# Patient Record
Sex: MALE | Race: WHITE | URBAN - METROPOLITAN AREA
[De-identification: names, ages, dates, MRNs, and addresses within clinical notes are randomized per-mention and may not be internally consistent; named-entity substitution may affect disease eponyms.]

---

## 2020-08-07 ENCOUNTER — NEW PATIENT (OUTPATIENT)
Dept: URBAN - METROPOLITAN AREA CLINIC 30 | Facility: CLINIC | Age: 70
End: 2020-08-07
Payer: COMMERCIAL

## 2020-08-07 DIAGNOSIS — H25.813 COMBINED FORMS OF AGE-RELATED CATARACT, BILATERAL: ICD-10-CM

## 2020-08-07 DIAGNOSIS — H35.61 RETINAL HEMORRHAGE, RIGHT EYE: Primary | ICD-10-CM

## 2020-08-07 DIAGNOSIS — H40.023 OPEN ANGLE WITH BORDERLINE FINDINGS, HIGH RISK, BILATERAL: ICD-10-CM

## 2020-08-07 PROCEDURE — 92134 CPTRZ OPH DX IMG PST SGM RTA: CPT | Performed by: OPTOMETRIST

## 2020-08-07 PROCEDURE — 92004 COMPRE OPH EXAM NEW PT 1/>: CPT | Performed by: OPTOMETRIST

## 2020-08-07 ASSESSMENT — KERATOMETRY
OS: 45.37
OD: 45.95

## 2020-08-07 ASSESSMENT — VISUAL ACUITY
OD: 20/50
OS: 20/250

## 2020-08-07 ASSESSMENT — INTRAOCULAR PRESSURE
OS: 14
OD: 14

## 2020-10-13 ENCOUNTER — Encounter (OUTPATIENT)
Dept: URBAN - METROPOLITAN AREA CLINIC 30 | Facility: CLINIC | Age: 70
End: 2020-10-13
Payer: COMMERCIAL

## 2020-10-13 DIAGNOSIS — Z01.818 ENCOUNTER FOR OTHER PREPROCEDURAL EXAMINATION: Primary | ICD-10-CM

## 2020-10-13 DIAGNOSIS — H25.12 AGE-RELATED NUCLEAR CATARACT, LEFT EYE: Primary | ICD-10-CM

## 2020-10-13 PROCEDURE — 99202 OFFICE O/P NEW SF 15 MIN: CPT | Performed by: PHYSICIAN ASSISTANT

## 2020-10-13 PROCEDURE — 99213 OFFICE O/P EST LOW 20 MIN: CPT | Performed by: PHYSICIAN ASSISTANT

## 2020-10-13 RX ORDER — FUROSEMIDE 20 MG/1
20 MG TABLET ORAL
Qty: 0 | Refills: 0 | Status: INACTIVE
Start: 2020-10-13 | End: 2020-11-03

## 2020-10-13 RX ORDER — METOPROLOL TARTRATE 50 MG/1
50 MG TABLET, FILM COATED ORAL
Qty: 0 | Refills: 0 | Status: INACTIVE
Start: 2020-10-13 | End: 2020-11-03

## 2020-10-19 ENCOUNTER — NEW PATIENT (OUTPATIENT)
Dept: URBAN - METROPOLITAN AREA CLINIC 24 | Facility: CLINIC | Age: 70
End: 2020-10-19
Payer: COMMERCIAL

## 2020-10-19 PROCEDURE — 92002 INTRM OPH EXAM NEW PATIENT: CPT | Performed by: OPHTHALMOLOGY

## 2020-10-19 ASSESSMENT — INTRAOCULAR PRESSURE
OD: 15
OS: 15

## 2020-10-26 ENCOUNTER — SURGERY (OUTPATIENT)
Dept: URBAN - METROPOLITAN AREA SURGERY 12 | Facility: SURGERY | Age: 70
End: 2020-10-26
Payer: COMMERCIAL

## 2020-10-26 PROCEDURE — 66984 XCAPSL CTRC RMVL W/O ECP: CPT | Performed by: OPHTHALMOLOGY

## 2020-10-27 ENCOUNTER — POST OP (OUTPATIENT)
Dept: URBAN - METROPOLITAN AREA CLINIC 30 | Facility: CLINIC | Age: 70
End: 2020-10-27
Payer: COMMERCIAL

## 2020-10-27 PROCEDURE — 99024 POSTOP FOLLOW-UP VISIT: CPT | Performed by: OPTOMETRIST

## 2020-10-27 RX ORDER — POLYETHYLENE GLYCOL 400 AND PROPYLENE GLYCOL 4; 3 MG/ML; MG/ML
SOLUTION/ DROPS OPHTHALMIC
Qty: 0 | Refills: 0 | Status: INACTIVE
Start: 2020-10-27 | End: 2021-11-19

## 2020-10-27 ASSESSMENT — INTRAOCULAR PRESSURE: OS: 10

## 2020-11-03 ENCOUNTER — POST OP (OUTPATIENT)
Dept: URBAN - METROPOLITAN AREA CLINIC 30 | Facility: CLINIC | Age: 70
End: 2020-11-03
Payer: COMMERCIAL

## 2020-11-03 DIAGNOSIS — Z96.1 PRESENCE OF INTRAOCULAR LENS: Primary | ICD-10-CM

## 2020-11-03 PROCEDURE — 99024 POSTOP FOLLOW-UP VISIT: CPT | Performed by: OPTOMETRIST

## 2020-11-03 ASSESSMENT — INTRAOCULAR PRESSURE
OD: 12
OS: 9

## 2020-11-03 ASSESSMENT — VISUAL ACUITY
OS: 20/25
OD: 20/50

## 2020-11-09 ENCOUNTER — SURGERY (OUTPATIENT)
Dept: URBAN - METROPOLITAN AREA SURGERY 12 | Facility: SURGERY | Age: 70
End: 2020-11-09
Payer: COMMERCIAL

## 2020-11-09 PROCEDURE — 66984 XCAPSL CTRC RMVL W/O ECP: CPT | Performed by: OPHTHALMOLOGY

## 2020-11-10 ENCOUNTER — POST OP (OUTPATIENT)
Dept: URBAN - METROPOLITAN AREA CLINIC 30 | Facility: CLINIC | Age: 70
End: 2020-11-10
Payer: COMMERCIAL

## 2020-11-10 PROCEDURE — 99024 POSTOP FOLLOW-UP VISIT: CPT | Performed by: OPTOMETRIST

## 2020-11-10 ASSESSMENT — INTRAOCULAR PRESSURE: OD: 12

## 2020-12-01 ENCOUNTER — POST OP (OUTPATIENT)
Dept: URBAN - METROPOLITAN AREA CLINIC 30 | Facility: CLINIC | Age: 70
End: 2020-12-01
Payer: COMMERCIAL

## 2020-12-01 PROCEDURE — 99024 POSTOP FOLLOW-UP VISIT: CPT | Performed by: OPTOMETRIST

## 2020-12-01 RX ORDER — MINERAL OIL, PETROLATUM 425; 573 MG/G; MG/G
OINTMENT OPHTHALMIC
Qty: 1 | Refills: 11 | Status: INACTIVE
Start: 2020-12-01 | End: 2021-11-19

## 2020-12-01 RX ORDER — CARBOXYMETHYLCELLULOSE SODIUM 5 MG/ML
0.5 % SOLUTION/ DROPS OPHTHALMIC
Qty: 1 | Refills: 11 | Status: INACTIVE
Start: 2020-12-01 | End: 2021-11-19

## 2020-12-01 ASSESSMENT — INTRAOCULAR PRESSURE
OD: 11
OS: 10

## 2020-12-01 ASSESSMENT — VISUAL ACUITY
OD: 20/25
OS: 20/25

## 2021-03-01 ENCOUNTER — FOLLOW UP ESTABLISHED (OUTPATIENT)
Dept: URBAN - METROPOLITAN AREA CLINIC 30 | Facility: CLINIC | Age: 71
End: 2021-03-01
Payer: COMMERCIAL

## 2021-03-01 PROCEDURE — 99213 OFFICE O/P EST LOW 20 MIN: CPT | Performed by: OPTOMETRIST

## 2021-03-01 PROCEDURE — 92133 CPTRZD OPH DX IMG PST SGM ON: CPT | Performed by: OPTOMETRIST

## 2021-03-01 PROCEDURE — 92083 EXTENDED VISUAL FIELD XM: CPT | Performed by: OPTOMETRIST

## 2021-03-01 ASSESSMENT — INTRAOCULAR PRESSURE
OS: 10
OD: 10

## 2021-11-19 ENCOUNTER — OFFICE VISIT (OUTPATIENT)
Dept: URBAN - METROPOLITAN AREA CLINIC 30 | Facility: CLINIC | Age: 71
End: 2021-11-19
Payer: COMMERCIAL

## 2021-11-19 DIAGNOSIS — H43.811 VITREOUS DEGENERATION, RIGHT EYE: ICD-10-CM

## 2021-11-19 DIAGNOSIS — H26.493 OTHER SECONDARY CATARACT, BILATERAL: ICD-10-CM

## 2021-11-19 PROCEDURE — 92014 COMPRE OPH EXAM EST PT 1/>: CPT | Performed by: OPTOMETRIST

## 2021-11-19 PROCEDURE — 92134 CPTRZ OPH DX IMG PST SGM RTA: CPT | Performed by: OPTOMETRIST

## 2021-11-19 ASSESSMENT — KERATOMETRY
OS: 45.34
OD: 45.70

## 2021-11-19 ASSESSMENT — VISUAL ACUITY
OD: 20/25
OS: 20/30

## 2021-11-19 ASSESSMENT — INTRAOCULAR PRESSURE
OD: 11
OS: 11

## 2021-11-19 NOTE — IMPRESSION/PLAN
Impression: Retinal hemorrhage, right eye Plan: Temporal hemorrhages on exam. AV crossing changes OU. Patient denies DM. Is on warfarin. Longstanding history of heart disease. Triple bypass in the past per patient. Reduced ejection fraction. Pt see cardiologist q6 months-will see again next month. Denies any black out in vision. MAC OCT WNL OU.

## 2021-11-19 NOTE — IMPRESSION/PLAN
Impression: Open angle with borderline findings, high risk, bilateral
+FHx-mother Tmax 15 OU Plan: Due to cupping OS. IOPs again are low. RNFL 3/1 abnormal NFL OD, normal NFL OS (poor scan quality) VF 3/1 OD high FP with sup and inf defects, OS inf arc defects Need to repeat VF test (was not scheduled for today), pt educ on findings. No tx at this time.

## 2021-11-19 NOTE — IMPRESSION/PLAN
Impression: Vitreous degeneration, right eye: H43.811. Plan: Pt educ on findings. Retinas flat and attached OU. Reviewed signs and symptoms of RD and to call asap if occurs.

## 2022-03-28 ENCOUNTER — OFFICE VISIT (OUTPATIENT)
Dept: URBAN - METROPOLITAN AREA CLINIC 30 | Facility: CLINIC | Age: 72
End: 2022-03-28
Payer: COMMERCIAL

## 2022-03-28 PROCEDURE — 92083 EXTENDED VISUAL FIELD XM: CPT | Performed by: OPTOMETRIST

## 2022-03-28 PROCEDURE — 99213 OFFICE O/P EST LOW 20 MIN: CPT | Performed by: OPTOMETRIST

## 2022-03-28 ASSESSMENT — INTRAOCULAR PRESSURE
OS: 10
OD: 11

## 2022-03-28 NOTE — IMPRESSION/PLAN
Impression: Open angle with borderline findings, high risk, bilateral
+FHx-mother Tmax 15 OU Plan: Due to cupping OS. IOPs are stable and  low: 11/10 RNFL 3/21 abnormal NFL OD, normal NFL OS (poor scan quality) VF 3/21 OD high FP with sup and inf defects, OS inf arc defects VF 3/2022:  dense sup defects, OS inf/nasal defects Pt educ on findings, pt feels he didn't understand the VF test today at first, will repeat and if defects are repeatable will consider neuroimaging order due to consistent low tensions.

## 2022-05-04 ENCOUNTER — OFFICE VISIT (OUTPATIENT)
Dept: URBAN - METROPOLITAN AREA CLINIC 30 | Facility: CLINIC | Age: 72
End: 2022-05-04
Payer: COMMERCIAL

## 2022-05-04 DIAGNOSIS — H40.1231 LOW-TENSION GLAUCOMA, BILATERAL, MILD STAGE: Primary | ICD-10-CM

## 2022-05-04 PROCEDURE — 92083 EXTENDED VISUAL FIELD XM: CPT | Performed by: OPTOMETRIST

## 2022-05-04 PROCEDURE — 99214 OFFICE O/P EST MOD 30 MIN: CPT | Performed by: OPTOMETRIST

## 2022-05-04 RX ORDER — LATANOPROST 50 UG/ML
0.005 % SOLUTION OPHTHALMIC
Qty: 5 | Refills: 5 | Status: ACTIVE
Start: 2022-05-04

## 2022-05-04 ASSESSMENT — INTRAOCULAR PRESSURE
OS: 9
OD: 10

## 2022-05-04 NOTE — IMPRESSION/PLAN
Impression: Low-tension glaucoma, bilateral, mild stage: W61.7889. +FHx- mother Tmax 15 OU
+Asthma Plan: Due to cupping OS. IOPs are stable and  low: 11/10 RNFL 3/21 abnormal NFL OD, normal NFL OS (poor scan quality) VF 3/21 OD high FP with sup and inf defects, OS inf arc defects VF 3/2022:  dense sup defects, OS inf/nasal defects; VF 5/22 repeatable sup defects OD and inf OS. Extensive cardiac history, notes he has congestive heart failure since ~2020, h/o heart valve replacement and has pacemaker since 2020. Notes BP has been lower, under 100/50 often. Consider neuroimaging order due to consistent low tensions. CT order wo contrast given to pt today. Pt will be seen by Rosy arzola tomorrow- he plans to bring imaging order. Start latanoprost qhs OU.

## 2022-08-09 ENCOUNTER — OFFICE VISIT (OUTPATIENT)
Dept: URBAN - METROPOLITAN AREA CLINIC 30 | Facility: CLINIC | Age: 72
End: 2022-08-09
Payer: COMMERCIAL

## 2022-08-09 DIAGNOSIS — H40.1231 LOW-TENSION GLAUCOMA, BILATERAL, MILD STAGE: Primary | ICD-10-CM

## 2022-08-09 PROCEDURE — 92133 CPTRZD OPH DX IMG PST SGM ON: CPT | Performed by: OPTOMETRIST

## 2022-08-09 PROCEDURE — 99213 OFFICE O/P EST LOW 20 MIN: CPT | Performed by: OPTOMETRIST

## 2022-08-09 ASSESSMENT — INTRAOCULAR PRESSURE
OD: 9
OS: 11

## 2022-08-09 NOTE — IMPRESSION/PLAN
Impression: Low-tension glaucoma, bilateral, mild stage: D26.4827. +FHx- mother Tmax 15 OU
+Asthma Plan: Due to cupping OS. Lost to follow up due to issue c [employer] auth. IOPs low and stable today, (9,11), on latanoprost QHS OU trial since last visit noting overall compliance but did not instill gtts last night. RNFL 3/21 abnormal NFL OD, normal NFL OS (poor scan quality). RNFL 8/2022: normal NFL OU, (82,75), better scan quality. VF 3/21 OD high FP with sup and inf defects, OS inf arc defects VF 3/2022:  dense sup defects, OS inf/nasal defects; VF 5/22 repeatable sup defects OD and inf OS. Extensive cardiac history, notes he has congestive heart failure since ~2020, h/o heart valve replacement and has pacemaker since 2020. Notes BP has been lower, under 100/50 often. Ordered neuroimaging due to consistent low tensions: CT order wo contrast, have not received results-ask for records release asap. Continue latanoprost QHS OU.

## 2022-11-09 ENCOUNTER — OFFICE VISIT (OUTPATIENT)
Dept: URBAN - METROPOLITAN AREA CLINIC 30 | Facility: CLINIC | Age: 72
End: 2022-11-09
Payer: COMMERCIAL

## 2022-11-09 DIAGNOSIS — H35.61 RETINAL HEMORRHAGE, RIGHT EYE: ICD-10-CM

## 2022-11-09 DIAGNOSIS — H43.811 VITREOUS DEGENERATION, RIGHT EYE: ICD-10-CM

## 2022-11-09 DIAGNOSIS — H40.1231 LOW-TENSION GLAUCOMA, BILATERAL, MILD STAGE: Primary | ICD-10-CM

## 2022-11-09 DIAGNOSIS — H34.211 HOLLENHORST'S PLAQUE OF RIGHT EYE: ICD-10-CM

## 2022-11-09 DIAGNOSIS — H26.493 OTHER SECONDARY CATARACT, BILATERAL: ICD-10-CM

## 2022-11-09 PROCEDURE — 99214 OFFICE O/P EST MOD 30 MIN: CPT | Performed by: OPTOMETRIST

## 2022-11-09 PROCEDURE — 92134 CPTRZ OPH DX IMG PST SGM RTA: CPT | Performed by: OPTOMETRIST

## 2022-11-09 ASSESSMENT — VISUAL ACUITY
OD: 20/20
OS: 20/20

## 2022-11-09 ASSESSMENT — KERATOMETRY
OS: 45.46
OD: 45.95

## 2022-11-09 ASSESSMENT — INTRAOCULAR PRESSURE
OD: 10
OS: 10

## 2022-11-09 NOTE — IMPRESSION/PLAN
Impression: Retinal hemorrhage, right eye Plan: Temporal hemorrhages on exam. AV crossing changes OU. Patient previously denies DM. Is on warfarin. Longstanding history of heart disease. Triple bypass in the past per patient. Reduced ejection fraction. Pt see cardiologist q6 months-last seen last week. Denies any black out in vision. See [place] Plaque notes.

## 2022-11-09 NOTE — IMPRESSION/PLAN
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 Plan: white embolus R inf branch CRA @ 1st bifurcation. Possible origin is carotid artery/ heart valves. Metoprolol was recently increased per pt. Long-term warfarin. See retinal hemorrhage notes. S/sx TIA/CVA reviewed. Pt instructed to proceed to ER immediately if experience. MAC  OCT today. Notes given to patient today.  He will follow up c cardiologist.

## 2022-11-09 NOTE — IMPRESSION/PLAN
Impression: Low-tension glaucoma, bilateral, mild stage: N36.8244. +FHx- mother Tmax 15 OU
+Asthma Plan: Due to cupping OS. IOPs low and stable today, 10 OU, on latanoprost QHS OU. RNFL 3/21 abnormal NFL OD, normal NFL OS (poor scan quality). RNFL 8/2022: normal NFL OU, (82,75), better scan quality. VF 3/21 OD high FP with sup and inf defects, OS inf arc defects VF 3/2022:  dense sup defects, OS inf/nasal defects; VF 5/22 repeatable sup defects OD and inf OS. Extensive cardiac history, notes he has congestive heart failure since ~2020, h/o heart valve replacement and has pacemaker since 2020. Notes BP has been lower, under 100/50 often. Ordered neuroimaging due to consistent low tensions: CT order wo contrast, have not received results-ask for records release asap. Continue latanoprost QHS OU. STILL HAVE NOT RECEIVED CT RESULTS, pt will ask PCP at [facility].

## 2024-01-15 ENCOUNTER — OFFICE VISIT (OUTPATIENT)
Dept: URBAN - METROPOLITAN AREA CLINIC 30 | Facility: CLINIC | Age: 74
End: 2024-01-15
Payer: COMMERCIAL

## 2024-01-15 DIAGNOSIS — H40.1231 LOW-TENSION GLAUCOMA, BILATERAL, MILD STAGE: Primary | ICD-10-CM

## 2024-01-15 DIAGNOSIS — H43.811 VITREOUS DEGENERATION, RIGHT EYE: ICD-10-CM

## 2024-01-15 DIAGNOSIS — H26.493 OTHER SECONDARY CATARACT, BILATERAL: ICD-10-CM

## 2024-01-15 DIAGNOSIS — H35.61 RETINAL HEMORRHAGE, RIGHT EYE: ICD-10-CM

## 2024-01-15 PROCEDURE — 92014 COMPRE OPH EXAM EST PT 1/>: CPT | Performed by: OPTOMETRIST

## 2024-01-15 PROCEDURE — 92134 CPTRZ OPH DX IMG PST SGM RTA: CPT | Performed by: OPTOMETRIST

## 2024-01-15 ASSESSMENT — KERATOMETRY
OD: 45.38
OS: 45.75

## 2024-01-15 ASSESSMENT — VISUAL ACUITY
OS: 20/25
OD: 20/25

## 2024-01-15 ASSESSMENT — INTRAOCULAR PRESSURE
OD: 11
OS: 11

## 2024-03-07 ENCOUNTER — OFFICE VISIT (OUTPATIENT)
Dept: URBAN - METROPOLITAN AREA CLINIC 30 | Facility: CLINIC | Age: 74
End: 2024-03-07
Payer: COMMERCIAL

## 2024-03-07 DIAGNOSIS — H40.1231 LOW-TENSION GLAUCOMA, BILATERAL, MILD STAGE: Primary | ICD-10-CM

## 2024-03-07 PROCEDURE — 99214 OFFICE O/P EST MOD 30 MIN: CPT | Performed by: OPTOMETRIST

## 2024-03-07 PROCEDURE — 92133 CPTRZD OPH DX IMG PST SGM ON: CPT | Performed by: OPTOMETRIST

## 2024-03-07 PROCEDURE — 92083 EXTENDED VISUAL FIELD XM: CPT | Performed by: OPTOMETRIST

## 2024-03-07 RX ORDER — LATANOPROST 50 UG/ML
0.005 % SOLUTION OPHTHALMIC
Qty: 7.5 | Refills: 3 | Status: ACTIVE
Start: 2024-03-07

## 2024-03-07 ASSESSMENT — INTRAOCULAR PRESSURE
OD: 10
OS: 10

## 2024-07-09 ENCOUNTER — OFFICE VISIT (OUTPATIENT)
Dept: URBAN - METROPOLITAN AREA CLINIC 30 | Facility: CLINIC | Age: 74
End: 2024-07-09
Payer: COMMERCIAL

## 2024-07-09 DIAGNOSIS — H40.1231 LOW-TENSION GLAUCOMA, BILATERAL, MILD STAGE: Primary | ICD-10-CM

## 2024-07-09 PROCEDURE — 92250 FUNDUS PHOTOGRAPHY W/I&R: CPT | Performed by: OPTOMETRIST

## 2024-07-09 PROCEDURE — 99213 OFFICE O/P EST LOW 20 MIN: CPT | Performed by: OPTOMETRIST

## 2024-07-09 ASSESSMENT — INTRAOCULAR PRESSURE
OS: 8
OD: 8

## 2025-01-09 ENCOUNTER — OFFICE VISIT (OUTPATIENT)
Dept: URBAN - METROPOLITAN AREA CLINIC 30 | Facility: CLINIC | Age: 75
End: 2025-01-09
Payer: COMMERCIAL

## 2025-01-09 DIAGNOSIS — H35.61 RETINAL HEMORRHAGE, RIGHT EYE: ICD-10-CM

## 2025-01-09 DIAGNOSIS — H26.493 OTHER SECONDARY CATARACT, BILATERAL: ICD-10-CM

## 2025-01-09 DIAGNOSIS — H40.1231 LOW-TENSION GLAUCOMA, BILATERAL, MILD STAGE: Primary | ICD-10-CM

## 2025-01-09 DIAGNOSIS — H43.811 VITREOUS DEGENERATION, RIGHT EYE: ICD-10-CM

## 2025-01-09 PROCEDURE — 92134 CPTRZ OPH DX IMG PST SGM RTA: CPT | Performed by: OPTOMETRIST

## 2025-01-09 PROCEDURE — 99214 OFFICE O/P EST MOD 30 MIN: CPT | Performed by: OPTOMETRIST

## 2025-01-09 PROCEDURE — 92133 CPTRZD OPH DX IMG PST SGM ON: CPT | Performed by: OPTOMETRIST

## 2025-01-09 ASSESSMENT — INTRAOCULAR PRESSURE
OS: 15
OS: 16
OD: 14

## 2025-01-09 ASSESSMENT — KERATOMETRY
OD: 45.63
OS: 45.50

## 2025-01-09 ASSESSMENT — VISUAL ACUITY
OD: 20/20
OS: 20/20

## 2025-02-18 ENCOUNTER — SURGERY (OUTPATIENT)
Facility: LOCATION | Age: 75
End: 2025-02-18
Payer: COMMERCIAL

## 2025-04-15 ENCOUNTER — OFFICE VISIT (OUTPATIENT)
Dept: URBAN - METROPOLITAN AREA CLINIC 30 | Facility: CLINIC | Age: 75
End: 2025-04-15
Payer: COMMERCIAL

## 2025-04-15 DIAGNOSIS — H52.4 PRESBYOPIA: ICD-10-CM

## 2025-04-15 DIAGNOSIS — H40.1231 LOW-TENSION GLAUCOMA, BILATERAL, MILD STAGE: Primary | ICD-10-CM

## 2025-04-15 PROCEDURE — 99213 OFFICE O/P EST LOW 20 MIN: CPT | Performed by: OPTOMETRIST

## 2025-04-15 ASSESSMENT — INTRAOCULAR PRESSURE
OS: 10
OD: 9

## 2025-06-24 ENCOUNTER — OFFICE VISIT (OUTPATIENT)
Dept: URBAN - METROPOLITAN AREA CLINIC 30 | Facility: CLINIC | Age: 75
End: 2025-06-24
Payer: COMMERCIAL

## 2025-06-24 DIAGNOSIS — H40.1231 LOW-TENSION GLAUCOMA, BILATERAL, MILD STAGE: Primary | ICD-10-CM

## 2025-06-24 PROCEDURE — 92083 EXTENDED VISUAL FIELD XM: CPT | Performed by: OPTOMETRIST

## 2025-06-24 PROCEDURE — 99213 OFFICE O/P EST LOW 20 MIN: CPT | Performed by: OPTOMETRIST

## 2025-06-24 RX ORDER — LATANOPROST 50 UG/ML
0.005 % SOLUTION OPHTHALMIC
Qty: 7.5 | Refills: 3 | Status: ACTIVE
Start: 2025-06-24

## 2025-06-24 ASSESSMENT — KERATOMETRY
OS: 45.38
OD: 45.75

## 2025-06-24 ASSESSMENT — INTRAOCULAR PRESSURE
OS: 12
OD: 10